# Patient Record
Sex: MALE | Race: BLACK OR AFRICAN AMERICAN | Employment: FULL TIME | URBAN - METROPOLITAN AREA
[De-identification: names, ages, dates, MRNs, and addresses within clinical notes are randomized per-mention and may not be internally consistent; named-entity substitution may affect disease eponyms.]

---

## 2017-05-12 ENCOUNTER — APPOINTMENT (OUTPATIENT)
Dept: NUTRITION | Age: 47
End: 2017-05-12

## 2017-05-26 ENCOUNTER — APPOINTMENT (OUTPATIENT)
Dept: NUTRITION | Age: 47
End: 2017-05-26

## 2020-10-29 ENCOUNTER — TRANSCRIBE ORDER (OUTPATIENT)
Dept: SCHEDULING | Age: 50
End: 2020-10-29

## 2020-10-29 DIAGNOSIS — R60.9 EDEMA: Primary | ICD-10-CM

## 2020-11-09 ENCOUNTER — OFFICE VISIT (OUTPATIENT)
Dept: ORTHOPEDIC SURGERY | Age: 50
End: 2020-11-09
Payer: COMMERCIAL

## 2020-11-09 VITALS
HEART RATE: 91 BPM | DIASTOLIC BLOOD PRESSURE: 91 MMHG | TEMPERATURE: 97.4 F | HEIGHT: 66 IN | SYSTOLIC BLOOD PRESSURE: 149 MMHG | WEIGHT: 315 LBS | BODY MASS INDEX: 50.62 KG/M2 | OXYGEN SATURATION: 97 %

## 2020-11-09 DIAGNOSIS — M25.531 RIGHT WRIST PAIN: ICD-10-CM

## 2020-11-09 DIAGNOSIS — E66.01 OBESITY, MORBID (HCC): ICD-10-CM

## 2020-11-09 DIAGNOSIS — M25.431 SWELLING OF RIGHT WRIST: Primary | ICD-10-CM

## 2020-11-09 PROCEDURE — 73130 X-RAY EXAM OF HAND: CPT | Performed by: ORTHOPAEDIC SURGERY

## 2020-11-09 PROCEDURE — 99203 OFFICE O/P NEW LOW 30 MIN: CPT | Performed by: ORTHOPAEDIC SURGERY

## 2020-11-09 RX ORDER — METOPROLOL SUCCINATE 50 MG/1
150 TABLET, EXTENDED RELEASE ORAL DAILY
COMMUNITY
Start: 2020-09-10

## 2020-11-09 RX ORDER — LISINOPRIL 10 MG/1
10 TABLET ORAL DAILY
COMMUNITY
Start: 2020-10-21 | End: 2022-03-27

## 2020-11-09 RX ORDER — OMEPRAZOLE 20 MG/1
CAPSULE, DELAYED RELEASE ORAL
COMMUNITY
Start: 2020-10-25

## 2020-11-09 RX ORDER — BUMETANIDE 2 MG/1
TABLET ORAL
COMMUNITY
Start: 2020-10-21 | End: 2021-07-31

## 2020-11-09 RX ORDER — RIVAROXABAN 20 MG/1
20 TABLET, FILM COATED ORAL
COMMUNITY
Start: 2020-10-26

## 2020-11-09 RX ORDER — ERGOCALCIFEROL 1.25 MG/1
CAPSULE ORAL
COMMUNITY
Start: 2020-10-21 | End: 2021-04-26

## 2020-11-09 RX ORDER — MELOXICAM 15 MG/1
15 TABLET ORAL DAILY
Refills: 0 | Status: CANCELLED | OUTPATIENT
Start: 2020-11-09

## 2020-11-09 RX ORDER — FUROSEMIDE 40 MG/1
40 TABLET ORAL
COMMUNITY
Start: 2020-09-10 | End: 2021-04-26

## 2020-11-09 RX ORDER — POTASSIUM CHLORIDE 1.5 G/1.77G
20 POWDER, FOR SOLUTION ORAL DAILY
COMMUNITY
End: 2021-04-26

## 2020-11-09 NOTE — PROGRESS NOTES
Madhu Delcid is a 45 y.o. male right handed unspecified employment. Worker's Compensation and legal considerations: none filed. Vitals:    11/09/20 1410   BP: (!) 149/91   Pulse: 91   Temp: 97.4 °F (36.3 °C)   TempSrc: Temporal   SpO2: 97%   Weight: (!) 401 lb 9.6 oz (182.2 kg)   Height: 5' 6\" (1.676 m)   PainSc:  10 - Worst pain ever   PainLoc: Wrist           Chief Complaint   Patient presents with    Wrist Pain     right wrist         HPI: Patient comes in today with complaints of right wrist pain ever since having a catheterization in September 2020 he reports stiffness and decreased range of motion about the wrist.    Date of onset: October 2020    Injury: Yes: Comment: Possibly iatrogenic injury status post catheterization    Prior Treatment:  No    Numbness/ Tingling: No      ROS: Review of Systems - General ROS: negative  Psychological ROS: negative  ENT ROS: negative  Allergy and Immunology ROS: negative  Hematological and Lymphatic ROS: negative  Respiratory ROS: no cough, shortness of breath, or wheezing  Cardiovascular ROS: no chest pain or dyspnea on exertion  Gastrointestinal ROS: no abdominal pain, change in bowel habits, or black or bloody stools  Musculoskeletal ROS: positive for - pain in wrist - right and swelling in wrist - right  Neurological ROS: negative  Dermatological ROS: negative    No past medical history on file. No past surgical history on file. Current Outpatient Medications   Medication Sig Dispense Refill    lisinopriL (PRINIVIL, ZESTRIL) 5 mg tablet take 1 tablet by mouth once daily      metoprolol succinate (TOPROL-XL) 100 mg tablet Take 100 mg by mouth daily.  multivit-min-folic-vit K-lycop 04362-216 mcg tab Take 1 Tab by mouth daily.  omeprazole (PRILOSEC) 20 mg capsule take 1 capsule by mouth once daily      potassium chloride (KLOR-CON) 20 mEq pack Take 20 mEq by mouth daily.  furosemide (LASIX) 40 mg tablet Take 40 mg by mouth.       Xarelto 20 mg tab tablet       Vitamin D2 1,250 mcg (50,000 unit) capsule take 1 capsule by mouth every week      bumetanide (BUMEX) 2 mg tablet take 1 tablet by mouth twice a day         No Known Allergies        PE:     Physical Exam  Vitals signs and nursing note reviewed. Constitutional:       General: He is not in acute distress. Appearance: Normal appearance. He is not ill-appearing. Eyes:      Extraocular Movements: Extraocular movements intact. Pupils: Pupils are equal, round, and reactive to light. Neck:      Musculoskeletal: Normal range of motion and neck supple. Cardiovascular:      Pulses: Normal pulses. Pulmonary:      Effort: Pulmonary effort is normal. No respiratory distress. Abdominal:      General: Abdomen is flat. There is no distension. Musculoskeletal: Normal range of motion. General: Swelling and tenderness present. No deformity or signs of injury. Right lower leg: No edema. Left lower leg: No edema. Skin:     General: Skin is warm and dry. Capillary Refill: Capillary refill takes less than 2 seconds. Findings: No bruising or erythema. Neurological:      General: No focal deficit present. Mental Status: He is alert and oriented to person, place, and time. Psychiatric:         Mood and Affect: Mood normal.         Behavior: Behavior normal.            Right wrist: There is diffuse edema and tenderness to palpation about the wrist.  Range of motion is limited due to pain. There is tenderness over the first dorsal compartment as well as other over the ulnar aspect and dorsally centrally over the wrist.      Imaging:     Plain films 11/9/2020 of right wrist shows possible osteopenia about the carpus and the distal radius. There is no acute fracture dislocation or any other osseous abnormalities. ICD-10-CM ICD-9-CM    1.  Swelling of right wrist  M25.431 719.03 MRI WRIST RT WO CONT      REFERRAL TO OCCUPATIONAL THERAPY   2. Right wrist pain  M25.531 719.43 AMB POC XRAY, HAND; 3+ VIEWS      MRI WRIST RT WO CONT      REFERRAL TO OCCUPATIONAL THERAPY   3. Obesity, morbid (HCC)  E66.01 278.01          Plan:     MRI of right wrist without contrast to rule out a soft tissue injury or root cause of inflammation. Follow-up and Dispositions    · Return for OT follow-up and MRI review.           Plan was reviewed with patient, who verbalized agreement and understanding of the plan

## 2020-11-10 DIAGNOSIS — M25.431 SWELLING OF RIGHT WRIST: ICD-10-CM

## 2020-11-10 DIAGNOSIS — M25.531 RIGHT WRIST PAIN: ICD-10-CM

## 2020-11-19 ENCOUNTER — TELEPHONE (OUTPATIENT)
Dept: ORTHOPEDIC SURGERY | Age: 50
End: 2020-11-19

## 2020-11-19 ENCOUNTER — HOSPITAL ENCOUNTER (OUTPATIENT)
Dept: MRI IMAGING | Age: 50
Discharge: HOME | End: 2020-11-19
Attending: ORTHOPAEDIC SURGERY

## 2020-11-19 ENCOUNTER — DOCUMENTATION ONLY (OUTPATIENT)
Dept: ORTHOPEDIC SURGERY | Age: 50
End: 2020-11-19

## 2020-11-19 NOTE — PROGRESS NOTES
Patients MRI order for right wrist, demographics and office note were faxed to [facility] imaging center requesting open unit.  Tel# [phone] fax# [phone]

## 2020-11-19 NOTE — TELEPHONE ENCOUNTER
Patients MRI order for right wrist, demographics and office note were faxed to [facility] imaging center requesting open unit.  Tel# [phone] fax# [phone]

## 2020-11-19 NOTE — TELEPHONE ENCOUNTER
Dex Nagy from [address] [place] [address] called to let Ynes Hammond know that the patient was unable to get the mri done of the Right Wrist. That the machine was to tight for him. [place]. [phone].

## 2020-12-09 ENCOUNTER — HOSPITAL ENCOUNTER (OUTPATIENT)
Dept: PHYSICAL THERAPY | Age: 50
Discharge: HOME | End: 2020-12-09
Payer: COMMERCIAL

## 2020-12-09 PROCEDURE — 97165 OT EVAL LOW COMPLEX 30 MIN: CPT

## 2020-12-09 NOTE — PROGRESS NOTES
OT DAILY TREATMENT NOTE     Patient Name: Keith Gonzales  Date:2020  : 1970  [x]  Patient  Verified  Payor: Sally Eduardo / Plan: Francisco Hector / Product Type: HMO /    In time: 3:01  Out time: 3:40  Total Treatment Time (min): 39  Visit #: 1 of 8    Treatment Area: Pain in right wrist [M25.531]  Effusion, right wrist [M25.431]    SUBJECTIVE  Pain Level (0-10 scale): 10/10  Any medication changes, allergies to medications, adverse drug reactions, diagnosis change, or new procedure performed?: [x] No    [] Yes (see summary sheet for update)  Subjective functional status/changes:   [] No changes reported  Pt reports desire to regain function in left hand. OBJECTIVE    39 min [x]Eval                  []Re-Eval       With   [] TE   [] TA   [] neuro   [x] other: Patient Education: [] Review HEP    [] Progressed/Changed HEP based on:   [] positioning   [] body mechanics   [] transfers   [x] heat/ice application Discussed heat/ice options for pain management.   [] Splint wear/care   [] Sensory re-education   [] scar management      [] other:            Other Objective/Functional Measures:     Subjective: Pt is a right hand dominant, 45 y.o., male who sustained his injury in 2020 during a exploratory medical procedure. The cardiologist placed a catheter into his volar right wrist in order to check his heart. Pt has experienced severe pain in right hand and wrist ever since this procedure. 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 I self care, cooking, home care, and driving. Likes to [employer] and read. Pain level:(0-no pain 10-debilitating pain) severe    Description/Location: right hand with c/o no releif   Worst pain 10/10 Least pain  8/10   Activities which aggravate pain: It is always bad reports pt. Activities which ease pain: Medication.  Some reprieve when sleeping  Current functional limitations/living situation: Living with his 3 children (12 and 16 with a set of twins). Cannot use right hand at all right now due to intense pain. He is right handed, but has learned to use his left hand to perform self care, home care, driving and cooking. Medical hx: HTN, Heart issue. Medications: See the written copy of this report in the patient's paper medical record. Objective:  Edema: Circumference    Right  Left   Styolid Level  20 cm  18.5 cm   MCP   21 cm  21 cm     Range of Motion:     Active     Norms Right Left   Wrist Flex 0-80 40p 55    Ext 0-70 15p 35    Ulnar Dev 0-30 10p 28    Radial Dev 0-20 10p 18     Hand Strength:   Gross Grasp 3pt Pinch Lateral Pinch Tip Pinch   Right  10p 3p 5p 4p   Left 31 6 12 7     Nine-Hole Peg Test:  Left= _26____seconds  Right=_41p____seconds  Finger Opposition: Unable to oppose to 4th and 5th digits on right hand.     ADLs:  Feeding:        []MaxA   []ModA   [x]Kenny   [] CGA   []SBA   []Cristopher   []Independent  UE Dressing:       []MaxA   []ModA   []Kenny   [] CGA   []SBA   []Cristopher   [x]Independent  LE Dressing:       []MaxA   []ModA   []Kenny   [] CGA   []SBA   []Cristopher   [x]Independent  Grooming:       []MaxA   []ModA   [x]Kenny   [] CGA   []SBA   []Cristopher   []Independent  Toileting:       []MaxA   []ModA   [x]Kenny   [] CGA   []SBA   []Cristopher   []Independent  Bathing:       []MaxA   []ModA   [x]Kenny   [] CGA   []SBA   []Cristopher   []Independent  Light Meal Prep:    []MaxA   []ModA   [x]Kenny   [] CGA   []SBA   []Cristopher   []Independent  Household/Other: []MaxA   []ModA   [x]Kenny   [] CGA   []SBA   []Cristopher   []Independent  Adaptive Equip:     []MaxA   []ModA   []Kenny   [] CGA   []SBA   []Cristopher   []Independent  Driving:       []MaxA   []ModA   []Kenny   [] CGA   []SBA   []Cristopher   [x]Independent  Money Mgmt:  Using left hand      []MaxA   []ModA   [x]Kenny   [] CGA   []SBA   []Cristopher   []Independent       Pain Level (0-10 scale) post treatment: 9.5/10     ASSESSMENT/Changes in Function:      [x]  See Plan of Care  []  See progress note/recertification  []  See Discharge Summary    Tova Bains OTS 12/9/2020  2:56 PM    Jarrod Lane OTR/L    Future Appointments   Date Time Provider Kodi Oviedo   12/9/2020  3:00 PM Cruz Bourne, OTR/L [facility]

## 2020-12-09 NOTE — PROGRESS NOTES
[facility] [address]  [phone] fax    Plan of Care/Statement of Necessity for Occupational Therapy Services    Patient name: Jose Guadalupe Bhakta Start of Care: 2020   Referral source: Michelle Taveras DO : 1970    Medical Diagnosis: Pain in right wrist [M25.531]  Effusion, right wrist [M25.431]  Payor: MELCHOR SPARKS / Plan: Talat Espinoza / Product Type: HMO /  Onset Date:2020 following medical procedure. Treatment Diagnosis: Pain in right hand and wrist, decreased ROM and strength. Prior Hospitalization: see medical history Provider#: [id number]   Medications: Verified on Patient summary List    Comorbidities: HTN, Heart issue. 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 I self care, cooking, home care, and driving. Likes to [employer] and read. The Plan of Care and following information is based on the information from the initial evaluation. Assessment/ key information:   Pt is a right hand dominant, 45 y.o. male who sustained his injury in 2020 during a exploratory medical procedure. The cardiologist placed a catheter into his volar right wrist in order to check his heart. Pt has experienced severe pain in right hand and wrist ever since this procedure. Patient reports a pain level that ranges from 8/10 to 10/10 with a 10/10 at the time of the evaluation. Pt has a FOTO score of 36 and presents with a severe impairment of his right wrist/hand that inhibits his ability to carry, move and handle objects. Pt has edema at right wrist and dorsal hand. Pt presents with diminished right wrist flexion (Right 40; Left 55), extension (Right 15, Left 35), right wrist ulnar deviation (Right 10, Left 28) and radial deviation (Right 10, Left 18).   Pt demonstrated a reduced right  strength (Right 10; Left 31) and decreased pinch strength in all 3 pinches. Pt has decreased fine motor skills in right hand as shown by 9 hole peg test (Right 41, Left 26 seconds). Pt is unable to oppose with right thumb to digits 4 and 5. Pt will benefit from skilled occupational therapy services by increasing right wrist ROM,  strength, pinch strength, fine motor skills, and decreasing pain and edema. Evaluation Complexity: History LOW Complexity : Brief history review  Examination LOW Complexity : 1-3 performance deficits relating to physical, cognitive , or psychosocial skils that result in activity limitations and / or participation restrictions  Clinical Decision Making MEDIUM Complexity : Patient may present with comorbidities that affect occupational performnce. Miniml to moderate modification of tasks or assistance (eg, physical or verbal ) with assesment(s) is necessary to enable patient to complete evaluation   Overall Complexity Rating: LOW     Problem List: Pain effecting function, Decreased range of motion, Decreased strength, Edema effecting function and Decreased ADL/functional abilities      Treatment Plan may include any combination of the following: Therapeutic exercise, Therapeutic activities, Physical agent/modality, Manual therapy, Patient education and ADLs/IADLs    Patient / Family readiness to learn indicated by: asking questions and trying to perform skills    Persons(s) to be included in education:   patient (P)    Barriers to Learning/Limitations: yes;  emotional and physical    Patient Goal (s): I want to regain use of my right hand    Patient Self Reported Health Status: good    Rehabilitation Potential: good     Short Term Goals: To be accomplished in 2 weeks:  1. Pt will be independent in HEP for right hand/wrist ROM exercises. 2. Pt will be independent in edema management strategies. 3. Pt will report pain of 0-4/10 for right hand/wrist while performing in clinic tasks. Long Term Goals:  To be accomplished in 4 weeks: 1. Pt will improve ROM in right wrist flexion by 20 degrees and extension by 20 degrees in order to drive and dress using right hand. 2. Pt will improve right  strength by 25# in order to handle pots and pans in the kitchen while cooking. 3. Pt will improve cumulative pinch strength in right hand by 15# in order to manipulate fasteners. 4. Pt will improve fine motor skills for right hand by 25% in order to perform baking tasks with tablespoons and measuring cups. 5. Pt will improve his ability to carry, juan manuel and move objects with right hand as shown by a 25 point improvement in his FOTO score. Frequency / Duration: Patient to be seen 2 times per week for 4 weeks:    Patient/ Caregiver education and instruction: Diagnosis, prognosis, self care, activity modification and exercises   [x]  Plan of care has been reviewed with Iris CAREY  12/9/2020 3:58 PM    Barb ALEXIS/LIGIA    _____________________________________________________________________    I certify that the above Therapy Services are being furnished while the patient is under my care. I agree with the treatment plan and certify that this therapy is necessary.     Physician's Signature:____________Date:_________TIME:________    ** Signature, Date and Time must be completed for valid certification **    Please sign and return to [facility] [employer] [facility]   [address] [place] [address]  [phone] fax

## 2020-12-14 ENCOUNTER — TELEPHONE (OUTPATIENT)
Dept: PHYSICAL THERAPY | Age: 50
End: 2020-12-14

## 2020-12-17 ENCOUNTER — HOSPITAL ENCOUNTER (OUTPATIENT)
Dept: PHYSICAL THERAPY | Age: 50
Discharge: HOME | End: 2020-12-17
Payer: COMMERCIAL

## 2020-12-17 PROCEDURE — 97110 THERAPEUTIC EXERCISES: CPT

## 2020-12-17 NOTE — PROGRESS NOTES
OT DAILY TREATMENT NOTE 10-18    Patient Name: Veena Ortega  Date:2020  : 1970  [x]  Patient  Verified  Payor: Reyna Nolna / Plan: Justin Arzate / Product Type: HMO /    In time:1115  Out time:1155  Total Treatment Time (min): 40  Visit #: 2 of 8    Medicare/BCBS Only   Total Timed Codes (min):  40 1:1 Treatment Time:  40     Treatment Area: Pain in right wrist [M25.531]  Effusion, right wrist [M25.431]    SUBJECTIVE  Pain Level (0-10 scale): 10/10  Any medication changes, allergies to medications, adverse drug reactions, diagnosis change, or new procedure performed?: [x] No    [] Yes (see summary sheet for update)  Subjective functional status/changes:   [] No changes reported  Patient reports increased swelling of Left Hand over the past three days. New onset of Pain/Swelling in Left hand causing major disruption to patients sleep. Patient reports he will be going to [facility] after OT appt due to current status of left hand. OBJECTIVE    40 min Therapeutic Exercise:  [] See flow sheet :   Rationale: increase ROM and increase strength to improve the patients ability to , grasp    HEP: Wrist Flexion/Extension- Fist/No Fist   Wrist Mazes: 5x each   Edema Measurements  Soft Theraputty: Right hand: 10/10    With   [] TE   [] TA   [] neuro   [] other: Patient Education: [x] Review HEP    [] Progressed/Changed HEP based on:   [] positioning   [] body mechanics   [] transfers   [] heat/ice application   [] Splint wear/care   [] Sensory re-education   [] scar management      [] other:            Other Objective/Functional Measures:       Hand Edema         Right Left   DPC 21 22   Wrist 19.7 20.5   Forearm 23.3 21.8               Pain Level (0-10 scale) post treatment: 10/10    ASSESSMENT/Changes in Function:  Holding edema interventions due to concern with CHF at this time, patient made aware and voiced understanding. Pain noted through out session with all tasks.      Patient will continue to benefit from skilled OT services to modify and progress therapeutic interventions, address ROM deficits, address strength deficits and instruct in home and community integration to attain remaining goals. []  See Plan of Care  []  See progress note/recertification  []  See Discharge Summary         Progress towards goals / Updated goals:  Short Term Goals: To be accomplished in 2 weeks:  1. Pt will be independent in HEP for right hand/wrist ROM exercises. Status at Eval/Last Progress Note: Not Initiated  Status at Last Visit: Initiated Wrist HEP on this date 12/17    2. Pt will be independent in edema management strategies. Status at Eval/Last Progress Note: Not Addressed at Eval  Status at Last Visit: Not addressed on this date due to increase in Left hand edema/concern due to CHF  12/17    3. Pt will report pain of 0-4/10 for right hand/wrist while performing in clinic tasks. Status at Eval/Last Progress Note: 10/10  Status at Last Visit:     Long Term Goals: To be accomplished in 4 weeks:          1. Pt will improve ROM in right wrist flexion by 20 degrees and extension by 20 degrees in order to drive and dress using right hand. Status at Eval/Last Progress Note: 55/35  Status at Last Visit:    2. Pt will improve right  strength by 25# in order to handle pots and pans in the kitchen while cooking. Status at Eval/Last Progress Note:31  Status at Last Visit:    3. Pt will improve cumulative pinch strength in right hand by 15# in order to manipulate fasteners. Status at Eval/Last Progress Note:  Status at Last Visit:    4. Pt will improve fine motor skills for right hand by 25% in order to perform baking tasks with tablespoons and measuring cups. Status at Eval/Last Progress Note: 12#  Status at Last Visit:    5. Pt will improve his ability to carry, juan manuel and move objects with right hand as shown by a 25 point improvement in his FOTO score.   Status at Eval/Last Progress Note: 36  Status at Last Visit:       PLAN  []  Upgrade activities as tolerated     [x]  Continue plan of care  []  Update interventions per flow sheet       []  Discharge due to:_  []  Other:_      GEE Weber 12/17/2020  9:20 AM    Future Appointments   Date Time Provider Kodi Oviedo   12/17/2020 11:15 AM Corie BETANCOURT [facility]   12/21/2020 12:00 PM JADE Montoya [facility]   12/24/2020 11:15 AM JADE Montoya [facility]   12/28/2020  3:00 PM Hernesto Ortega, OTR/L [facility]   12/31/2020 11:15 AM Hernesto Ortega, OTR/L [facility]   1/4/2021 11:15 AM Hernesto Ortega, OTR/L [facility]   1/7/2021 12:00 PM Hernesto Ortega, OTR/L [facility]

## 2020-12-21 ENCOUNTER — HOSPITAL ENCOUNTER (OUTPATIENT)
Dept: PHYSICAL THERAPY | Age: 50
End: 2020-12-21
Payer: COMMERCIAL

## 2020-12-21 ENCOUNTER — TELEPHONE (OUTPATIENT)
Dept: PHYSICAL THERAPY | Age: 50
End: 2020-12-21

## 2020-12-24 ENCOUNTER — HOSPITAL ENCOUNTER (OUTPATIENT)
Dept: PHYSICAL THERAPY | Age: 50
End: 2020-12-24
Payer: COMMERCIAL

## 2020-12-28 ENCOUNTER — APPOINTMENT (OUTPATIENT)
Dept: PHYSICAL THERAPY | Age: 50
End: 2020-12-28
Payer: COMMERCIAL

## 2020-12-31 ENCOUNTER — APPOINTMENT (OUTPATIENT)
Dept: PHYSICAL THERAPY | Age: 50
End: 2020-12-31
Payer: COMMERCIAL

## 2021-01-04 ENCOUNTER — APPOINTMENT (OUTPATIENT)
Dept: PHYSICAL THERAPY | Age: 51
End: 2021-01-04

## 2021-01-07 ENCOUNTER — APPOINTMENT (OUTPATIENT)
Dept: PHYSICAL THERAPY | Age: 51
End: 2021-01-07

## 2021-02-01 NOTE — PROGRESS NOTES
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  I self care, cooking, home care, and driving. Likes to [employer] and read**  Visits from Start of Care: 2   Missed Visits: 3  Reporting Period : 20 to 2020    Summary of Care:patient seen for eval and one follow up visit only. Self discharged to another clinic. 1. Pt will be independent in HEP for right hand/wrist ROM exercises. Status at Eval/Last Progress Note: Not Initiated  Discharge Status : Not metInitiated Wrist HEP on this date      2. Pt will be independent in edema management strategies. Status at Eval/Last Progress Note: Not Addressed at [facility]  Discharge Status Not met. Not addressed on this date due to increase in Left hand edema/concern due to CHF       3. Pt will report pain of 0-4/10 for right hand/wrist while performing in clinic tasks. Status at Eval/Last Progress Note: 10/10  Discharge Status :Pain 10/10 not met     Long Term Goals: To be accomplished in 4 weeks:          1. Pt will improve ROM in right wrist flexion by 20 degrees and extension by 20 degrees in order to drive and dress using right hand.   Status at Eval/Last Progress Note: 55/35  Discharge Status: Not able to reassess       2. Pt will improve right  strength by 25# in order to handle pots and pans in the kitchen while cooking. Status at Eval/Last Progress Note:31  Discharge Status :Not reassessed due to unplanned discharge     3. Pt will improve cumulative pinch strength in right hand by 15# in order to manipulate fasteners. Status at Eval/Last Progress Note:  Discharge Status: Not reassessed due to unplanned discharge      4. Pt will improve fine motor skills for right hand by 25% in order to perform baking tasks with tablespoons and measuring cups. Status at Eval/Last Progress Note: 12#  Discharge Status: Not reassessed due to unplanned discharge      5. Pt will improve his ability to carry, juan manuel and move objects with right hand as shown by a 25 point improvement in his FOTO score. Status at Eval/Last Progress Note: 36  Discharge Status : Not reassessed due to unplanned discharge  ASSESSMENT/Changes in Function: Progress limited due to increased edema and pain and missed visits. ASSESSMENT/RECOMMENDATIONS:  [x]Discontinue therapy: []Patient has reached or is progressing toward set goals      [x]Patient is non-compliant or has abdicated-transferred to another provider      []Due to lack of appreciable progress towards set goals    ANSELMO Perez 2/1/2021 9:30 AM    NOTE TO PHYSICIAN:  Please complete the following and fax to: [facility] at [facility] at [phone]  . Retain this original for your records. If you are unable to process this request in   24 hours, please contact our office.      [] I have read the above report and request that my patient continue therapy with the following changes/special instructions:  [] I have read the above report and request that my patient be discharged from therapy    Physician's Signature:____________Date:_________TIME:________    ** Signature, Date and Time must be completed for valid certification **

## 2021-07-31 PROBLEM — L02.415 CELLULITIS AND ABSCESS OF RIGHT LEG: Status: ACTIVE | Noted: 2021-07-31

## 2021-07-31 PROBLEM — L03.115 CELLULITIS AND ABSCESS OF RIGHT LEG: Status: ACTIVE | Noted: 2021-07-31

## 2021-07-31 PROBLEM — L03.115 CELLULITIS OF RIGHT FOOT: Status: ACTIVE | Noted: 2021-07-31

## 2022-01-12 PROBLEM — E66.01 MORBID OBESITY (HCC): Status: ACTIVE | Noted: 2022-01-12

## 2022-03-25 PROBLEM — E86.0 DEHYDRATION: Status: ACTIVE | Noted: 2022-03-25

## 2022-03-25 PROBLEM — N17.9 AKI (ACUTE KIDNEY INJURY) (HCC): Status: ACTIVE | Noted: 2022-03-25

## 2022-03-25 PROBLEM — I95.9 HYPOTENSION: Status: ACTIVE | Noted: 2022-03-25

## 2022-03-25 PROBLEM — R42 DIZZINESS: Status: ACTIVE | Noted: 2022-03-25
